# Patient Record
Sex: FEMALE | Race: WHITE | Employment: FULL TIME | URBAN - METROPOLITAN AREA
[De-identification: names, ages, dates, MRNs, and addresses within clinical notes are randomized per-mention and may not be internally consistent; named-entity substitution may affect disease eponyms.]

---

## 2023-09-11 ENCOUNTER — OFFICE VISIT (OUTPATIENT)
Age: 43
End: 2023-09-11

## 2023-09-11 VITALS
WEIGHT: 143.2 LBS | HEIGHT: 64 IN | SYSTOLIC BLOOD PRESSURE: 103 MMHG | RESPIRATION RATE: 20 BRPM | TEMPERATURE: 98.2 F | DIASTOLIC BLOOD PRESSURE: 68 MMHG | OXYGEN SATURATION: 98 % | HEART RATE: 82 BPM | BODY MASS INDEX: 24.45 KG/M2

## 2023-09-11 DIAGNOSIS — J06.9 VIRAL URI: Primary | ICD-10-CM

## 2023-09-11 LAB
Lab: NORMAL
PERFORMING INSTRUMENT: NORMAL
QC PASS/FAIL: NORMAL
SARS-COV-2, POC: NORMAL
STREP PYOGENES DNA, POC: NEGATIVE
VALID INTERNAL CONTROL, POC: YES

## 2023-09-11 RX ORDER — LORATADINE PSEUDOEPHEDRINE SULFATE 10; 240 MG/1; MG/1
1 TABLET, EXTENDED RELEASE ORAL
Qty: 20 TABLET | Refills: 0 | Status: SHIPPED | OUTPATIENT
Start: 2023-09-11

## 2023-09-11 RX ORDER — FLUTICASONE PROPIONATE 50 MCG
2 SPRAY, SUSPENSION (ML) NASAL DAILY
Qty: 16 G | Refills: 0 | Status: SHIPPED | OUTPATIENT
Start: 2023-09-11

## 2023-09-11 RX ORDER — BENZONATATE 100 MG/1
100 CAPSULE ORAL 3 TIMES DAILY PRN
Qty: 30 CAPSULE | Refills: 0 | Status: SHIPPED | OUTPATIENT
Start: 2023-09-11 | End: 2023-09-21

## 2023-09-11 ASSESSMENT — ENCOUNTER SYMPTOMS
RHINORRHEA: 1
SORE THROAT: 1
COUGH: 1

## 2023-09-12 NOTE — PROGRESS NOTES
2023   Renea Claire (: 1980) is a 43 y.o. female, New patient, here for evaluation of the following chief complaint(s):  Cough, Congestion, and Sore Throat (Onset of symptoms x2 weeks)     ASSESSMENT/PLAN:  Below is the assessment and plan developed based on review of pertinent history, physical exam, labs, studies, and medications. 1. Viral URI  -     POCT COVID-19, Antigen  -     AMB POC STREP GO A DIRECT, DNA PROBE  -     benzonatate (TESSALON) 100 MG capsule; Take 1 capsule by mouth 3 times daily as needed for Cough, Disp-30 capsule, R-0Normal  -     loratadine-pseudoephedrine (CLARITIN-D 24 HOUR)  MG per extended release tablet; Take 1 tablet by mouth nightly as needed (congestion), Disp-20 tablet, R-0Normal  -     fluticasone (FLONASE) 50 MCG/ACT nasal spray; 2 sprays by Each Nostril route daily, Disp-16 g, R-0Normal         Handout given with care instructions  2. OTC for symptom management. Increase fluid intake, ensure adequate nutritional intake. 3. Follow up with PCP as needed. 4. Go to ED with development of any acute symptoms. Follow up:  Return if symptoms worsen or fail to improve. Follow up immediately for any new, worsening or changes or if symptoms are not improving over the next 5-7 days. SUBJECTIVE/OBJECTIVE:    Cough  Associated symptoms include postnasal drip, rhinorrhea and a sore throat. Diagnoses and all orders for this visit:  Viral URI  Renea Claire is a 43 y.o. female who complains of congestion, sore throat, nasal blockage, post nasal drip, and dry cough for 10 days. She denies a history of fatigue, fevers, and shortness of breath and denies a history of asthma. Patient denies smoke cigarettes. Patient also noted that OTC remedies did not help.  PROGRESSION: STABLE  SYMPTOMS        Results for orders placed or performed in visit on 23   POCT COVID-19, Antigen   Result Value Ref Range    SARS-COV-2, POC Not-Detected (A) Not Detected

## 2023-09-14 ENCOUNTER — TELEPHONE (OUTPATIENT)
Age: 43
End: 2023-09-14

## 2023-09-14 NOTE — TELEPHONE ENCOUNTER
----- Message from Jovanna Freitas sent at 9/11/2023  7:30 PM EDT -----  Regarding: Patient Care Follow Up  Please make a follow up call to patient to ensure they have picked up and started taking the prescribed medications. If condition does not improve, please advise patient to follow up with PCP.